# Patient Record
Sex: FEMALE | Race: WHITE | NOT HISPANIC OR LATINO | Employment: FULL TIME | URBAN - METROPOLITAN AREA
[De-identification: names, ages, dates, MRNs, and addresses within clinical notes are randomized per-mention and may not be internally consistent; named-entity substitution may affect disease eponyms.]

---

## 2017-05-04 ENCOUNTER — GENERIC CONVERSION - ENCOUNTER (OUTPATIENT)
Dept: OTHER | Facility: OTHER | Age: 22
End: 2017-05-04

## 2017-05-18 ENCOUNTER — ALLSCRIPTS OFFICE VISIT (OUTPATIENT)
Dept: OTHER | Facility: OTHER | Age: 22
End: 2017-05-18

## 2017-05-18 DIAGNOSIS — E04.1 NONTOXIC SINGLE THYROID NODULE: ICD-10-CM

## 2017-09-08 ENCOUNTER — ALLSCRIPTS OFFICE VISIT (OUTPATIENT)
Dept: OTHER | Facility: OTHER | Age: 22
End: 2017-09-08

## 2018-01-13 VITALS
DIASTOLIC BLOOD PRESSURE: 82 MMHG | BODY MASS INDEX: 34.55 KG/M2 | SYSTOLIC BLOOD PRESSURE: 124 MMHG | HEIGHT: 61 IN | WEIGHT: 183 LBS

## 2018-01-14 VITALS
RESPIRATION RATE: 16 BRPM | HEART RATE: 96 BPM | SYSTOLIC BLOOD PRESSURE: 120 MMHG | BODY MASS INDEX: 34.93 KG/M2 | DIASTOLIC BLOOD PRESSURE: 80 MMHG | WEIGHT: 185 LBS | HEIGHT: 61 IN | OXYGEN SATURATION: 97 %
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
## 2018-02-05 ENCOUNTER — OFFICE VISIT (OUTPATIENT)
Dept: FAMILY MEDICINE CLINIC | Facility: CLINIC | Age: 23
End: 2018-02-05
Payer: COMMERCIAL

## 2018-02-05 VITALS
WEIGHT: 197 LBS | SYSTOLIC BLOOD PRESSURE: 110 MMHG | BODY MASS INDEX: 36.25 KG/M2 | HEART RATE: 70 BPM | HEIGHT: 62 IN | DIASTOLIC BLOOD PRESSURE: 70 MMHG | RESPIRATION RATE: 16 BRPM | OXYGEN SATURATION: 98 %

## 2018-02-05 DIAGNOSIS — Z00.00 PHYSICAL EXAM: ICD-10-CM

## 2018-02-05 DIAGNOSIS — Z11.1 SCREENING-PULMONARY TB: Primary | ICD-10-CM

## 2018-02-05 PROCEDURE — 86580 TB INTRADERMAL TEST: CPT

## 2018-02-05 PROCEDURE — 99499 UNLISTED E&M SERVICE: CPT | Performed by: FAMILY MEDICINE

## 2018-02-05 NOTE — PATIENT INSTRUCTIONS
Patient is healthy and medically stable  No active medical issues I see no contraindication to her volunteering at [facility] [employer]  She will return to 2 days for a PPD

## 2018-02-05 NOTE — PROGRESS NOTES
Assessment/Plan:    No problem-specific Assessment & Plan notes found for this encounter  Diagnoses and all orders for this visit:    Screening-pulmonary TB  -     BCG VACCINE PERCUT        Patient Instructions   Patient is healthy and medically stable  No active medical issues I see no contraindication to her volunteering at [employer]  She will return to 2 days for a PPD  Subjective:      Patient ID: Paulo Edouard is a 25 y o  female  Patient here for brief physical exam to rule out become a volunteer [facility]   She has no active medical issues  She is having a PPD placed today  Social history was reviewed  Only medication is Mirena  The following portions of the patient's history were reviewed and updated as appropriate: allergies, current medications, past family history, past medical history, past social history and problem list     Review of Systems   Constitutional: Negative for chills, diaphoresis, fatigue and fever  HENT: Negative for congestion, sinus pressure and sore throat  Respiratory: Negative for cough, chest tightness and shortness of breath  Cardiovascular: Negative for chest pain and palpitations  Gastrointestinal: Negative for abdominal pain, diarrhea, nausea and vomiting  Skin: Negative for rash  Psychiatric/Behavioral: Negative for behavioral problems, decreased concentration and suicidal ideas  The patient is not nervous/anxious  Objective:     Physical Exam   Constitutional: She is oriented to person, place, and time  She appears well-developed and well-nourished  No distress  HENT:   Head: Normocephalic and atraumatic  Right Ear: Tympanic membrane, external ear and ear canal normal    Left Ear: Tympanic membrane, external ear and ear canal normal    Nose: No mucosal edema or rhinorrhea  Right sinus exhibits no maxillary sinus tenderness  Left sinus exhibits no maxillary sinus tenderness     Mouth/Throat: Uvula is midline  Mucous membranes are not pale and not dry  Normal dentition  No oropharyngeal exudate  Eyes: EOM are normal  Pupils are equal, round, and reactive to light  Right eye exhibits no discharge  Left eye exhibits no discharge  Neck: Normal range of motion  Neck supple  No thyromegaly present  Cardiovascular: Normal rate, regular rhythm, normal heart sounds and intact distal pulses  No murmur heard  Pulmonary/Chest: Effort normal and breath sounds normal  No respiratory distress  She has no wheezes  She has no rales  Abdominal: Soft  Bowel sounds are normal  She exhibits no mass  There is no tenderness  Musculoskeletal: Normal range of motion  She exhibits no edema or tenderness  Lymphadenopathy:     She has no cervical adenopathy  Neurological: She is alert and oriented to person, place, and time  No cranial nerve deficit  Skin: She is not diaphoretic  Psychiatric: She has a normal mood and affect   Her behavior is normal

## 2018-08-15 ENCOUNTER — OFFICE VISIT (OUTPATIENT)
Dept: FAMILY MEDICINE CLINIC | Facility: CLINIC | Age: 23
End: 2018-08-15
Payer: COMMERCIAL

## 2018-08-15 VITALS
WEIGHT: 192 LBS | BODY MASS INDEX: 35.33 KG/M2 | RESPIRATION RATE: 16 BRPM | HEART RATE: 76 BPM | HEIGHT: 62 IN | SYSTOLIC BLOOD PRESSURE: 110 MMHG | DIASTOLIC BLOOD PRESSURE: 60 MMHG | OXYGEN SATURATION: 98 %

## 2018-08-15 DIAGNOSIS — L03.115 CELLULITIS OF RIGHT LEG: Primary | ICD-10-CM

## 2018-08-15 DIAGNOSIS — L30.9 ECZEMA, UNSPECIFIED TYPE: ICD-10-CM

## 2018-08-15 PROCEDURE — 3008F BODY MASS INDEX DOCD: CPT | Performed by: NURSE PRACTITIONER

## 2018-08-15 PROCEDURE — 1036F TOBACCO NON-USER: CPT | Performed by: NURSE PRACTITIONER

## 2018-08-15 PROCEDURE — 99214 OFFICE O/P EST MOD 30 MIN: CPT | Performed by: NURSE PRACTITIONER

## 2018-08-15 RX ORDER — MOMETASONE FUROATE 1 MG/G
CREAM TOPICAL DAILY
Qty: 50 G | Refills: 3 | Status: SHIPPED | OUTPATIENT
Start: 2018-08-15 | End: 2019-02-01 | Stop reason: SDUPTHER

## 2018-08-15 RX ORDER — SULFAMETHOXAZOLE AND TRIMETHOPRIM 800; 160 MG/1; MG/1
1 TABLET ORAL EVERY 12 HOURS SCHEDULED
Qty: 28 TABLET | Refills: 0 | Status: SHIPPED | OUTPATIENT
Start: 2018-08-15 | End: 2018-08-29

## 2018-08-15 NOTE — PATIENT INSTRUCTIONS
1  Bactrim DS as directed, elevate often, cool compresses as discussed- return if no improvement or worse  2   Mometasone cream as directed for eczema  Call or return for problems/concerns

## 2018-08-15 NOTE — PROGRESS NOTES
[id number]         NAME: Shiva Beatty is a 25 y o  female  : 1995    MRN: [id number]  DATE: August 15, 2018  TIME: 3:44 PM    Assessment and Plan   Cellulitis of right leg [L03 115]  1  Cellulitis of right leg  sulfamethoxazole-trimethoprim (BACTRIM DS) 800-160 mg per tablet   2  Eczema, unspecified type  mometasone (ELOCON) 0 1 % cream         Patient Instructions     Patient Instructions   1  Bactrim DS as directed, elevate often, cool compresses as discussed- return if no improvement or worse  2  Mometasone cream as directed for eczema  Call or return for problems/concerns          Chief Complaint     Chief Complaint   Patient presents with    Rash     rash on back of right leg x 1 month         History of Present Illness       1 month ago developed a rash on the back of right leg- seen at [address] [place] [address]- given Cephalexin- did improve  Then went on vacation and rash worsened 1 week ago  No itching or pain, not drainage  Some swelling and discomfort with stretching the area  Also needs refill of Mometasone cream for her chronic eczema- primarily on arms, has had for years        Review of Systems   Review of Systems   Constitutional: Negative for activity change, diaphoresis, fatigue and fever  HENT: Negative for congestion, facial swelling, hearing loss, rhinorrhea, sinus pain, sinus pressure, sneezing, sore throat and voice change  Eyes: Negative for discharge and visual disturbance  Respiratory: Negative for cough, choking, chest tightness, shortness of breath, wheezing and stridor  Cardiovascular: Negative for chest pain, palpitations and leg swelling  Gastrointestinal: Negative for abdominal distention, abdominal pain, constipation, diarrhea, nausea and vomiting  Endocrine: Negative for polydipsia, polyphagia and polyuria  Genitourinary: Negative for difficulty urinating, dysuria, frequency and urgency     Musculoskeletal: Negative for arthralgias, back pain, gait problem, joint swelling, myalgias, neck pain and neck stiffness  Skin: Positive for rash  Negative for color change and wound  Neurological: Negative for dizziness, syncope, speech difficulty, weakness, light-headedness and headaches  Hematological: Negative for adenopathy  Does not bruise/bleed easily  Psychiatric/Behavioral: Negative for agitation, behavioral problems, confusion, hallucinations, sleep disturbance and suicidal ideas  The patient is not nervous/anxious  Current Medications       Current Outpatient Prescriptions:     mometasone (ELOCON) 0 1 % cream, Apply topically daily, Disp: 50 g, Rfl: 3    sulfamethoxazole-trimethoprim (BACTRIM DS) 800-160 mg per tablet, Take 1 tablet by mouth every 12 (twelve) hours for 14 days, Disp: 28 tablet, Rfl: 0    Current Allergies     Allergies as of 08/15/2018    (No Known Allergies)            The following portions of the patient's history were reviewed and updated as appropriate: allergies, current medications, past family history, past medical history, past social history, past surgical history and problem list      No past medical history on file  No past surgical history on file  Family History   Problem Relation Age of Onset    Asthma Mother     Depression Mother     Anxiety disorder Mother     Substance Abuse Neg Hx          Medications have been verified  Objective   /60 (BP Location: Left arm, Patient Position: Sitting, Cuff Size: Large)   Pulse 76   Resp 16   Ht 5' 2" (1 575 m)   Wt 87 1 kg (192 lb)   SpO2 98%   BMI 35 12 kg/m²        Physical Exam     Physical Exam   Constitutional: She is oriented to person, place, and time  Vital signs are normal  She appears well-developed and well-nourished  She is active and cooperative  No distress  Eyes: EOM are normal    Cardiovascular: Normal rate, regular rhythm and normal heart sounds  No murmur heard    Pulmonary/Chest: Effort normal and breath sounds normal  No respiratory distress  She has no wheezes  Neurological: She is alert and oriented to person, place, and time  Skin: Skin is warm and dry  Rash noted  She is not diaphoretic  There is erythema  No pallor  Psychiatric: She has a normal mood and affect  Her behavior is normal  Judgment and thought content normal    Nursing note and vitals reviewed

## 2019-02-01 ENCOUNTER — OFFICE VISIT (OUTPATIENT)
Dept: FAMILY MEDICINE CLINIC | Facility: CLINIC | Age: 24
End: 2019-02-01
Payer: COMMERCIAL

## 2019-02-01 VITALS
DIASTOLIC BLOOD PRESSURE: 76 MMHG | SYSTOLIC BLOOD PRESSURE: 118 MMHG | BODY MASS INDEX: 29.81 KG/M2 | WEIGHT: 163 LBS | OXYGEN SATURATION: 99 % | HEART RATE: 67 BPM

## 2019-02-01 DIAGNOSIS — K58.2 IRRITABLE BOWEL SYNDROME WITH BOTH CONSTIPATION AND DIARRHEA: Primary | ICD-10-CM

## 2019-02-01 DIAGNOSIS — L30.9 ECZEMA, UNSPECIFIED TYPE: ICD-10-CM

## 2019-02-01 PROCEDURE — 99213 OFFICE O/P EST LOW 20 MIN: CPT | Performed by: NURSE PRACTITIONER

## 2019-02-01 RX ORDER — MOMETASONE FUROATE 1 MG/G
CREAM TOPICAL DAILY
Qty: 50 G | Refills: 3 | Status: SHIPPED | OUTPATIENT
Start: 2019-02-01 | End: 2020-01-09 | Stop reason: SDUPTHER

## 2019-02-01 RX ORDER — DICYCLOMINE HYDROCHLORIDE 10 MG/1
10 CAPSULE ORAL 4 TIMES DAILY PRN
Qty: 30 CAPSULE | Refills: 1 | Status: SHIPPED | OUTPATIENT
Start: 2019-02-01 | End: 2022-01-04

## 2019-02-01 NOTE — PROGRESS NOTES
[facility]        NAME: Lady Lewis is a 26 y o  female  : 1995    MRN: [id number]  DATE: 2019  TIME: 4:44 PM    Assessment and Plan   Irritable bowel syndrome with both constipation and diarrhea [K58 2]  1  Irritable bowel syndrome with both constipation and diarrhea  dicyclomine (BENTYL) 10 mg capsule   2  Eczema, unspecified type  mometasone (ELOCON) 0 1 % cream         Patient Instructions     Patient Instructions   Take Bentyl as directed  Decrease stress as discussed  Elimination diet as discussed  Call return with any problems or concerns          Chief Complaint     Chief Complaint   Patient presents with    Change in Bowel Habits     constipation, diarrhea, sometimes vomitting--worse for last 6 months         History of Present Illness       WAS ON KETO DIET FROM AUG TO DEC  WAS HAVING DIARRHEA MULTIPLE TIMES A DAY AND STOPPED KETO DIET  Diarrhea is occasional and is having issues with constipation  Eating mostly whole foods, does eat  Dairy and some carbs  Has increased stressed from school and 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 which has also added stress  Needs refill on eczema cream         Review of Systems   Review of Systems   Constitutional: Negative for appetite change and unexpected weight change  HENT: Negative  Eyes: Negative  Cardiovascular: Negative  Gastrointestinal: Positive for abdominal distention, abdominal pain (cramping with diarrhea), constipation and diarrhea  Endocrine: Negative  Genitourinary: Negative  Musculoskeletal: Negative  Skin: Negative            Current Medications       Current Outpatient Prescriptions:     dicyclomine (BENTYL) 10 mg capsule, Take 1 capsule (10 mg total) by mouth 4 (four) times a day as needed (IBS), Disp: 30 capsule, Rfl: 1    mometasone (ELOCON) 0 1 % cream, Apply topically daily, Disp: 50 g, Rfl: 3    Current Allergies     Allergies as of 2019    (No Known Allergies)            The following portions of the patient's history were reviewed and updated as appropriate: allergies, current medications, past family history, past medical history, past social history, past surgical history and problem list      No past medical history on file  No past surgical history on file  Family History   Problem Relation Age of Onset    Asthma Mother     Depression Mother     Anxiety disorder Mother     Substance Abuse Neg Hx          Medications have been verified  Objective   /76   Pulse 67   Wt 73 9 kg (163 lb)   SpO2 99%   BMI 29 81 kg/m²        Physical Exam     Physical Exam   Constitutional: She is oriented to person, place, and time  Vital signs are normal  She appears well-developed and well-nourished  She is active and cooperative  No distress  Eyes: EOM are normal    Cardiovascular: Normal rate, regular rhythm and normal heart sounds  No murmur heard  Pulmonary/Chest: Effort normal and breath sounds normal  No respiratory distress  She has no wheezes  Abdominal: Soft  Bowel sounds are normal  She exhibits no distension and no mass  There is no tenderness  There is no rebound and no guarding  Neurological: She is alert and oriented to person, place, and time  Skin: Skin is warm and dry  No rash noted  She is not diaphoretic  No erythema  Psychiatric: She has a normal mood and affect  Her behavior is normal  Judgment and thought content normal    Nursing note and vitals reviewed

## 2019-02-01 NOTE — PATIENT INSTRUCTIONS
Take Bentyl as directed  Decrease stress as discussed    Elimination diet as discussed  Call return with any problems or concerns

## 2020-01-09 ENCOUNTER — OFFICE VISIT (OUTPATIENT)
Dept: FAMILY MEDICINE CLINIC | Facility: CLINIC | Age: 25
End: 2020-01-09
Payer: COMMERCIAL

## 2020-01-09 VITALS
HEIGHT: 61 IN | BODY MASS INDEX: 32.28 KG/M2 | SYSTOLIC BLOOD PRESSURE: 120 MMHG | WEIGHT: 171 LBS | DIASTOLIC BLOOD PRESSURE: 70 MMHG

## 2020-01-09 DIAGNOSIS — L30.9 ECZEMA, UNSPECIFIED TYPE: ICD-10-CM

## 2020-01-09 DIAGNOSIS — F41.9 ANXIETY: Primary | ICD-10-CM

## 2020-01-09 PROCEDURE — 3008F BODY MASS INDEX DOCD: CPT | Performed by: FAMILY MEDICINE

## 2020-01-09 PROCEDURE — 99214 OFFICE O/P EST MOD 30 MIN: CPT | Performed by: FAMILY MEDICINE

## 2020-01-09 RX ORDER — CITALOPRAM 10 MG/1
10 TABLET ORAL DAILY
Qty: 90 TABLET | Refills: 1 | Status: SHIPPED | OUTPATIENT
Start: 2020-01-09 | End: 2020-10-05 | Stop reason: SDUPTHER

## 2020-01-09 RX ORDER — TRIAMCINOLONE ACETONIDE 5 MG/G
OINTMENT TOPICAL 2 TIMES DAILY
Qty: 45 G | Refills: 5 | Status: SHIPPED | OUTPATIENT
Start: 2020-01-09 | End: 2020-10-05 | Stop reason: SDUPTHER

## 2020-01-09 RX ORDER — MOMETASONE FUROATE 1 MG/G
CREAM TOPICAL DAILY
Qty: 50 G | Refills: 3 | Status: SHIPPED | OUTPATIENT
Start: 2020-01-09 | End: 2022-01-04 | Stop reason: SDUPTHER

## 2020-01-09 NOTE — PATIENT INSTRUCTIONS
See meds for eczema---started on celexa at [facility] doesn't like side effects--good candidate for med lisa

## 2020-01-09 NOTE — PROGRESS NOTES
[address]  [facility]        NAME: Tila Rich is a 22 y o  female  : 1995    MRN: [id number]  DATE: 2020  TIME: 9:51 AM    Assessment and Plan   Anxiety [F41 9]  1  Anxiety  citalopram (CeleXA) 10 mg tablet   2  Eczema, unspecified type  triamcinolone (KENALOG) 0 5 % ointment    mometasone (ELOCON) 0 1 % cream         Patient Instructions     Patient Instructions   See meds for eczema---started on celexa at [address] [place] [address]   No chief complaint on file  History of Present Illness       C/o eczema--hands      Review of Systems   Review of Systems   Constitutional: Negative  Respiratory: Negative  Cardiovascular: Negative  Musculoskeletal: Negative  Neurological: Negative  Current Medications       Current Outpatient Medications:     citalopram (CeleXA) 10 mg tablet, Take 1 tablet (10 mg total) by mouth daily, Disp: 90 tablet, Rfl: 1    dicyclomine (BENTYL) 10 mg capsule, Take 1 capsule (10 mg total) by mouth 4 (four) times a day as needed (IBS), Disp: 30 capsule, Rfl: 1    mometasone (ELOCON) 0 1 % cream, Apply topically daily, Disp: 50 g, Rfl: 3    triamcinolone (KENALOG) 0 5 % ointment, Apply topically 2 (two) times a day, Disp: 45 g, Rfl: 5    Current Allergies     Allergies as of 2020    (No Known Allergies)            The following portions of the patient's history were reviewed and updated as appropriate: allergies, current medications, past family history, past medical history, past social history, past surgical history and problem list      No past medical history on file  No past surgical history on file  Family History   Problem Relation Age of Onset    Asthma Mother     Depression Mother     Anxiety disorder Mother     Substance Abuse Neg Hx          Medications have been verified  Objective   There were no vitals taken for this visit         Physical Exam     Physical Exam   Cardiovascular: Normal heart sounds  Pulmonary/Chest: Breath sounds normal    Abdominal: Bowel sounds are normal    Skin:   C/w dishidrotic eczema--hands   BMI Counseling: There is no height or weight on file to calculate BMI  The BMI is above normal  Nutrition recommendations include reducing portion sizes

## 2020-03-24 DIAGNOSIS — R06.02 SOB (SHORTNESS OF BREATH): Primary | ICD-10-CM

## 2020-03-24 DIAGNOSIS — J45.909 UNCOMPLICATED ASTHMA, UNSPECIFIED ASTHMA SEVERITY, UNSPECIFIED WHETHER PERSISTENT: Primary | ICD-10-CM

## 2020-03-24 RX ORDER — ALBUTEROL SULFATE 90 UG/1
2 AEROSOL, METERED RESPIRATORY (INHALATION) EVERY 6 HOURS PRN
Qty: 1 INHALER | Refills: 5 | Status: CANCELLED | OUTPATIENT
Start: 2020-03-24

## 2020-03-24 NOTE — TELEPHONE ENCOUNTER
Pt call requeting albuterol inhaler pt stated she has used in the past and due to all this COVID happening she will like to have one in case she needed to be send to [facility] [place] Thank you

## 2020-03-25 RX ORDER — ALBUTEROL SULFATE 90 UG/1
2 AEROSOL, METERED RESPIRATORY (INHALATION) EVERY 6 HOURS PRN
Qty: 1 INHALER | Refills: 0 | Status: SHIPPED | OUTPATIENT
Start: 2020-03-25

## 2020-10-05 ENCOUNTER — OFFICE VISIT (OUTPATIENT)
Dept: FAMILY MEDICINE CLINIC | Facility: CLINIC | Age: 25
End: 2020-10-05
Payer: COMMERCIAL

## 2020-10-05 VITALS
BODY MASS INDEX: 38.33 KG/M2 | HEIGHT: 61 IN | TEMPERATURE: 98.1 F | RESPIRATION RATE: 16 BRPM | DIASTOLIC BLOOD PRESSURE: 76 MMHG | WEIGHT: 203 LBS | HEART RATE: 64 BPM | OXYGEN SATURATION: 97 % | SYSTOLIC BLOOD PRESSURE: 118 MMHG

## 2020-10-05 DIAGNOSIS — Z00.00 WELLNESS EXAMINATION: Primary | ICD-10-CM

## 2020-10-05 DIAGNOSIS — F41.9 ANXIETY: ICD-10-CM

## 2020-10-05 DIAGNOSIS — L30.9 ECZEMA, UNSPECIFIED TYPE: ICD-10-CM

## 2020-10-05 DIAGNOSIS — R53.83 FATIGUE, UNSPECIFIED TYPE: ICD-10-CM

## 2020-10-05 PROCEDURE — 1036F TOBACCO NON-USER: CPT | Performed by: FAMILY MEDICINE

## 2020-10-05 PROCEDURE — 99395 PREV VISIT EST AGE 18-39: CPT | Performed by: FAMILY MEDICINE

## 2020-10-05 RX ORDER — CITALOPRAM 10 MG/1
10 TABLET ORAL DAILY
Qty: 90 TABLET | Refills: 3 | Status: SHIPPED | OUTPATIENT
Start: 2020-10-05 | End: 2021-10-19

## 2020-10-05 RX ORDER — BUSPIRONE HYDROCHLORIDE 7.5 MG/1
7.5 TABLET ORAL 3 TIMES DAILY
Qty: 270 TABLET | Refills: 3 | Status: SHIPPED | OUTPATIENT
Start: 2020-10-05 | End: 2021-12-01

## 2020-10-05 RX ORDER — BUSPIRONE HYDROCHLORIDE 7.5 MG/1
7.5 TABLET ORAL 3 TIMES DAILY
COMMUNITY
End: 2020-10-05 | Stop reason: SDUPTHER

## 2020-10-05 RX ORDER — TRIAMCINOLONE ACETONIDE 5 MG/G
OINTMENT TOPICAL 2 TIMES DAILY
Qty: 45 G | Refills: 5 | Status: SHIPPED | OUTPATIENT
Start: 2020-10-05 | End: 2022-06-28 | Stop reason: SDUPTHER

## 2020-10-05 RX ORDER — CITALOPRAM 10 MG/1
10 TABLET ORAL DAILY
Qty: 90 TABLET | Refills: 3 | Status: SHIPPED | OUTPATIENT
Start: 2020-10-05 | End: 2020-10-05 | Stop reason: SDUPTHER

## 2020-12-21 ENCOUNTER — TELEMEDICINE (OUTPATIENT)
Dept: FAMILY MEDICINE CLINIC | Facility: CLINIC | Age: 25
End: 2020-12-21
Payer: COMMERCIAL

## 2020-12-21 VITALS — BODY MASS INDEX: 38.33 KG/M2 | WEIGHT: 203 LBS | HEIGHT: 61 IN

## 2020-12-21 DIAGNOSIS — F41.9 ANXIETY: Primary | ICD-10-CM

## 2020-12-21 PROCEDURE — 1036F TOBACCO NON-USER: CPT | Performed by: FAMILY MEDICINE

## 2020-12-21 PROCEDURE — 3725F SCREEN DEPRESSION PERFORMED: CPT | Performed by: FAMILY MEDICINE

## 2020-12-21 PROCEDURE — 99213 OFFICE O/P EST LOW 20 MIN: CPT | Performed by: FAMILY MEDICINE

## 2020-12-21 PROCEDURE — 3008F BODY MASS INDEX DOCD: CPT | Performed by: FAMILY MEDICINE

## 2021-10-17 DIAGNOSIS — F41.9 ANXIETY: ICD-10-CM

## 2021-10-19 RX ORDER — CITALOPRAM 10 MG/1
TABLET ORAL
Qty: 90 TABLET | Refills: 0 | Status: SHIPPED | OUTPATIENT
Start: 2021-10-19 | End: 2022-01-12

## 2021-12-01 DIAGNOSIS — F41.9 ANXIETY: ICD-10-CM

## 2021-12-01 RX ORDER — BUSPIRONE HYDROCHLORIDE 7.5 MG/1
7.5 TABLET ORAL 3 TIMES DAILY
Qty: 270 TABLET | Refills: 3 | Status: SHIPPED | OUTPATIENT
Start: 2021-12-01 | End: 2022-01-04 | Stop reason: SDUPTHER

## 2022-01-04 ENCOUNTER — OFFICE VISIT (OUTPATIENT)
Dept: FAMILY MEDICINE CLINIC | Facility: CLINIC | Age: 27
End: 2022-01-04
Payer: COMMERCIAL

## 2022-01-04 VITALS
BODY MASS INDEX: 39.08 KG/M2 | WEIGHT: 207 LBS | HEIGHT: 61 IN | SYSTOLIC BLOOD PRESSURE: 122 MMHG | HEART RATE: 68 BPM | DIASTOLIC BLOOD PRESSURE: 78 MMHG | RESPIRATION RATE: 16 BRPM

## 2022-01-04 DIAGNOSIS — L30.9 ECZEMA, UNSPECIFIED TYPE: ICD-10-CM

## 2022-01-04 DIAGNOSIS — J45.20 MILD INTERMITTENT ASTHMA WITHOUT COMPLICATION: ICD-10-CM

## 2022-01-04 DIAGNOSIS — F41.9 ANXIETY: ICD-10-CM

## 2022-01-04 DIAGNOSIS — Z00.00 WELLNESS EXAMINATION: Primary | ICD-10-CM

## 2022-01-04 PROCEDURE — 1036F TOBACCO NON-USER: CPT | Performed by: FAMILY MEDICINE

## 2022-01-04 PROCEDURE — 99395 PREV VISIT EST AGE 18-39: CPT | Performed by: FAMILY MEDICINE

## 2022-01-04 PROCEDURE — 3008F BODY MASS INDEX DOCD: CPT | Performed by: FAMILY MEDICINE

## 2022-01-04 PROCEDURE — 99214 OFFICE O/P EST MOD 30 MIN: CPT | Performed by: FAMILY MEDICINE

## 2022-01-04 PROCEDURE — 3725F SCREEN DEPRESSION PERFORMED: CPT | Performed by: FAMILY MEDICINE

## 2022-01-04 RX ORDER — MOMETASONE FUROATE 1 MG/G
CREAM TOPICAL DAILY
Qty: 50 G | Refills: 3 | Status: SHIPPED | OUTPATIENT
Start: 2022-01-04

## 2022-01-04 RX ORDER — BUSPIRONE HYDROCHLORIDE 10 MG/1
10 TABLET ORAL 3 TIMES DAILY
Qty: 270 TABLET | Refills: 3 | Status: SHIPPED | OUTPATIENT
Start: 2022-01-04

## 2022-01-04 RX ORDER — BUPROPION HYDROCHLORIDE 150 MG/1
150 TABLET ORAL EVERY MORNING
Qty: 90 TABLET | Refills: 3 | Status: SHIPPED | OUTPATIENT
Start: 2022-01-04

## 2022-01-04 NOTE — PROGRESS NOTES
Assessment/Plan:    No problem-specific Assessment & Plan notes found for this encounter  Diagnoses and all orders for this visit:    Wellness examination    Anxiety    Mild intermittent asthma without complication    Eczema, unspecified type  -     mometasone (ELOCON) 0 1 % cream; Apply topically daily          Subjective:   Chief Complaint   Patient presents with    Physical Exam        Patient ID: Brooke Jaimes is a 23 y o  female  Annual exam      The following portions of the patient's history were reviewed and updated as appropriate: allergies, current medications, past family history, past medical history, past social history, past surgical history and problem list     Review of Systems   Constitutional: Negative for fatigue, fever and unexpected weight change  HENT: Negative for congestion, sinus pain and sore throat  Eyes: Negative for visual disturbance  Respiratory: Negative for shortness of breath and wheezing  Cardiovascular: Negative for chest pain and palpitations  Gastrointestinal: Negative for abdominal pain, nausea and vomiting  Musculoskeletal: Negative  Negative for arthralgias and myalgias  Neurological: Negative for syncope, weakness and numbness  Psychiatric/Behavioral: Negative  Negative for confusion, dysphoric mood and suicidal ideas  Objective:  Vitals:    01/04/22 0758   BP: 122/78   Pulse: 68   Resp: 16   Weight: 93 9 kg (207 lb)   Height: 5' 1" (1 549 m)      Physical Exam  Constitutional:       Appearance: She is well-developed  HENT:      Right Ear: Ear canal normal  Tympanic membrane is not injected  Left Ear: Ear canal normal  Tympanic membrane is not injected  Nose: Nose normal    Eyes:      General:         Right eye: No discharge  Left eye: No discharge  Conjunctiva/sclera: Conjunctivae normal       Pupils: Pupils are equal, round, and reactive to light  Neck:      Thyroid: No thyromegaly     Cardiovascular: Rate and Rhythm: Normal rate and regular rhythm  Heart sounds: Normal heart sounds  No murmur heard  Pulmonary:      Effort: Pulmonary effort is normal  No respiratory distress  Breath sounds: Normal breath sounds  No wheezing  Abdominal:      General: Bowel sounds are normal  There is no distension  Palpations: Abdomen is soft  Tenderness: There is no abdominal tenderness  Musculoskeletal:         General: Normal range of motion  Cervical back: Normal range of motion and neck supple  Lymphadenopathy:      Cervical: No cervical adenopathy  Skin:     General: Skin is warm and dry  Neurological:      Mental Status: She is alert and oriented to person, place, and time  She is not disoriented  Sensory: No sensory deficit  Gait: Gait normal       Deep Tendon Reflexes: Reflexes are normal and symmetric  Psychiatric:         Speech: Speech normal          Behavior: Behavior normal          Thought Content:  Thought content normal          Judgment: Judgment normal

## 2022-01-04 NOTE — PROGRESS NOTES
HPI:  Guilherme Barton is a 23 y o  female here for her yearly health maintenance exam    Patient Active Problem List   Diagnosis    Thyroid nodule    Anxiety    Asthma     Past Medical History:   Diagnosis Date    Anxiety 2019    ADRYAN    Asthma 2009    Exercise Induced    Depression 2019       1  Advanced Directive: n     2  Durable Power of  for Healthcare: n     3  Social History:           Drug and alcohol History: n                  4  Immunizations up to date: y                 Lifestyle:                           Healthy Diet:y                          Alcohol Use:n                          Tobacco Use:n                          Regular exercise:y                          Weight concerns:n                               5  Over the past 2 weeks, how often have you been bothered by the following:              Little interest or pleasure in doing things:n              Felling down, depressed or hopeless:n       Current Outpatient Medications   Medication Sig Dispense Refill    albuterol (PROVENTIL HFA,VENTOLIN HFA) 90 mcg/act inhaler Inhale 2 puffs every 6 (six) hours as needed for wheezing or shortness of breath 1 Inhaler 0    busPIRone (BUSPAR) 7 5 mg tablet TAKE 1 TABLET (7 5 MG TOTAL) BY MOUTH 3 (THREE) TIMES A  tablet 3    citalopram (CeleXA) 10 mg tablet TAKE 1 TABLET BY MOUTH EVERY DAY 90 tablet 0    mometasone (ELOCON) 0 1 % cream Apply topically daily 50 g 3    triamcinolone (KENALOG) 0 5 % ointment Apply topically 2 (two) times a day 45 g 5     No current facility-administered medications for this visit       No Known Allergies  Immunization History   Administered Date(s) Administered    HPV Quadrivalent 06/26/2014, 08/11/2015, 01/04/2016    Influenza Quadrivalent Preservative Free 3 years and older IM 05/18/2017    Meningococcal MCV4P 08/11/2015    Meningococcal, Unknown Serogroups 08/11/2015    Tdap 07/25/2007    Tuberculin Skin Test-PPD Intradermal 02/05/2018       Patient Care Team:  Isaura Owens MD as PCP - General (Family Medicine)  Isaura Owens MD as PCP - General  MD Christi Reyes CRNP    Review of Systems   Constitutional: Negative for appetite change, chills, diaphoresis and fever  HENT: Negative for ear pain, rhinorrhea, sinus pressure and sore throat  Eyes: Negative for discharge, redness and itching  Respiratory: Negative for cough, shortness of breath and wheezing  Cardiovascular: Negative for chest pain and palpitations  Gastrointestinal: Negative for abdominal pain, diarrhea, nausea and vomiting  Physical Exam :  Physical Exam  Vitals and nursing note reviewed  Constitutional:       General: She is not in acute distress  Appearance: She is well-developed  She is not diaphoretic  HENT:      Right Ear: Tympanic membrane, ear canal and external ear normal  Tympanic membrane is not injected  Left Ear: Tympanic membrane, ear canal and external ear normal  Tympanic membrane is not injected  Nose: Nose normal    Eyes:      General:         Right eye: No discharge  Left eye: No discharge  Conjunctiva/sclera: Conjunctivae normal       Pupils: Pupils are equal, round, and reactive to light  Neck:      Thyroid: No thyromegaly  Cardiovascular:      Rate and Rhythm: Normal rate and regular rhythm  Heart sounds: Normal heart sounds  No murmur heard  Pulmonary:      Effort: Pulmonary effort is normal  No respiratory distress  Breath sounds: Normal breath sounds  No wheezing  Musculoskeletal:      Cervical back: Normal range of motion and neck supple  Lymphadenopathy:      Cervical: No cervical adenopathy  Assessment and Plan:  1  Wellness examination     2  Anxiety     3  Mild intermittent asthma without complication     4   Eczema, unspecified type  mometasone (ELOCON) 0 1 % cream       Health Maintenance Due   Topic Date Due    Hepatitis C Screening  Never done    COVID-19 Vaccine (1) Never done    HIV Screening  Never done    DTaP,Tdap,and Td Vaccines (2 - Td or Tdap) 07/25/2017    Cervical Cancer Screening  Never done    BMI: Followup Plan  01/09/2021    Influenza Vaccine (1) 09/01/2021    Annual Physical  10/05/2021

## 2022-01-12 DIAGNOSIS — F41.9 ANXIETY: ICD-10-CM

## 2022-01-12 RX ORDER — CITALOPRAM 10 MG/1
TABLET ORAL
Qty: 90 TABLET | Refills: 0 | Status: SHIPPED | OUTPATIENT
Start: 2022-01-12

## 2022-06-28 DIAGNOSIS — L30.9 ECZEMA, UNSPECIFIED TYPE: ICD-10-CM

## 2022-06-28 RX ORDER — TRIAMCINOLONE ACETONIDE 5 MG/G
OINTMENT TOPICAL 2 TIMES DAILY
Qty: 45 G | Refills: 0 | Status: SHIPPED | OUTPATIENT
Start: 2022-06-28

## 2022-12-16 DIAGNOSIS — F41.9 ANXIETY: ICD-10-CM

## 2022-12-16 RX ORDER — BUPROPION HYDROCHLORIDE 150 MG/1
150 TABLET ORAL EVERY MORNING
Qty: 90 TABLET | Refills: 0 | Status: SHIPPED | OUTPATIENT
Start: 2022-12-16

## 2022-12-19 NOTE — TELEPHONE ENCOUNTER
Called the patient to schedule annual and she states she moved to [place], [place] and she will no longer be seeing Lessie Cockayne as her pcp  PCP will be ended as of 12/9/22

## 2022-12-23 DIAGNOSIS — F41.9 ANXIETY: ICD-10-CM

## 2022-12-23 RX ORDER — BUSPIRONE HYDROCHLORIDE 10 MG/1
TABLET ORAL
Qty: 270 TABLET | Refills: 3 | Status: SHIPPED | OUTPATIENT
Start: 2022-12-23